# Patient Record
Sex: MALE | Race: WHITE
[De-identification: names, ages, dates, MRNs, and addresses within clinical notes are randomized per-mention and may not be internally consistent; named-entity substitution may affect disease eponyms.]

---

## 2020-01-01 ENCOUNTER — HOSPITAL ENCOUNTER (EMERGENCY)
Dept: HOSPITAL 25 - UCCORT | Age: 25
Discharge: HOME | End: 2020-01-01
Payer: COMMERCIAL

## 2020-01-01 DIAGNOSIS — J11.1: Primary | ICD-10-CM

## 2020-01-01 LAB — FLUAV RNA SPEC QL NAA+PROBE: POSITIVE

## 2020-01-01 PROCEDURE — G0463 HOSPITAL OUTPT CLINIC VISIT: HCPCS

## 2020-01-01 PROCEDURE — 99202 OFFICE O/P NEW SF 15 MIN: CPT

## 2020-01-01 NOTE — UC
FLU HPI





- HPI Summary


HPI Summary: 


24-year-old male comes in with a chief complaint of 2 days of flulike symptoms.

  He's got fevers chills body aches.  Feels like he got hit by truck.  He does 

have a sore throat when he coughs.  Does have some rhinorrhea.  Some sputum 

that he gets up is cloudy.  He is not a smoker.  Over-the-counter medications 

help some with the symptoms.





- History of Current Complaint


Chief Complaint: UCGeneralIllness


Stated Complaint: FLU SYMP


Time Seen by Provider: 01/01/20 08:08


Pain Intensity: 4





- Allergy/Home Medications


Allergies/Adverse Reactions: 


 Allergies











Allergy/AdvReac Type Severity Reaction Status Date / Time


 


No Known Allergies Allergy   Verified 01/01/20 07:53











Home Medications: 


 Home Medications





Phenylephrine/Dm/Acetaminop/GG [Tussin Cf Max Severe M-S Cold] 1 dose PO Q4HR 01 /01/20 [History Confirmed 01/01/20]











PMH/Surg Hx/FS Hx/Imm Hx


Previously Healthy: Yes





- Surgical History


Surgical History: Yes


Surgery Procedure, Year, and Place: left knee avulsion fx of tibia





- Family History


Known Family History: Positive: Hypertension, Other - Pagets disease





- Social History


Alcohol Use: None


Substance Use Type: None


Smoking Status (MU): Never Smoked Tobacco





Review of Systems


All Other Systems Reviewed And Are Negative: Yes


Constitutional: Positive: Fever, Chills, Other - SEE HPI


Skin: Positive: Negative


Eyes: Positive: Negative


ENT: Positive: Sore Throat, Nasal Discharge, Sinus Congestion


Respiratory: Positive: Cough, Other - SEE HPI


Cardiovascular: Positive: Negative


Gastrointestinal: Positive: Negative


Motor: Positive: Negative


Neurovascular: Positive: Negative


Musculoskeletal: Positive: Myalgia


Neurological: Positive: Negative


Psychological: Positive: Negative


Is Patient Immunocompromised?: No





Physical Exam


Triage Information Reviewed: Yes


Appearance: No Pain Distress, Well-Nourished, Ill-Appearing - MILD


Vital Signs: 


 Initial Vital Signs











Temp  98.4 F   01/01/20 07:49


 


Pulse  101   01/01/20 07:49


 


Resp  20   01/01/20 07:49


 


BP  113/75   01/01/20 07:49


 


Pulse Ox  97   01/01/20 07:49











Vital Signs Reviewed: Yes


Eye Exam: Normal


Eyes: Positive: Conjunctiva Clear


ENT: Positive: Pharynx normal, Nasal congestion, TMs normal


Neck: Positive: Supple


Respiratory: Positive: Lungs clear, Normal breath sounds, No respiratory 

distress


Cardiovascular: Positive: RRR


Musculoskeletal: Positive: Strength Intact, ROM Intact


Neurological: Positive: Alert, Muscle Tone Normal


Psychological: Positive: Age Appropriate Behavior


Skin Exam: Normal





Flu Course/Dx





- Differential Dx/Diagnosis


Provider Diagnosis: 


 Influenza








Discharge ED





- Sign-Out/Discharge


Documenting (check all that apply): Patient Departure


All imaging exams completed and their final reports reviewed: No Studies





- Discharge Plan


Condition: Stable


Disposition: HOME


Prescriptions: 


Oseltamivir Phosphate [Tamiflu] 75 mg PO BID #10 capsule


Patient Education Materials:  Influenza (ED)


Forms:  *Work Release


Referrals: 


Lina Cooper MD [Primary Care Provider] - 


Additional Instructions: 


FOLLOW UP WITH YOUR DOCTOR IF NOT COMPLETELY IMPROVED.


GET REEVALUATED SOONER IF NOT IMPROVING OR WORSE OR ANY QUESTIONS OR CONCERNS.











- Billing Disposition and Condition


Condition: STABLE


Disposition: Home